# Patient Record
Sex: MALE | Race: OTHER | HISPANIC OR LATINO | URBAN - METROPOLITAN AREA
[De-identification: names, ages, dates, MRNs, and addresses within clinical notes are randomized per-mention and may not be internally consistent; named-entity substitution may affect disease eponyms.]

---

## 2019-08-21 ENCOUNTER — EMERGENCY (EMERGENCY)
Facility: HOSPITAL | Age: 39
LOS: 1 days | Discharge: HOME | End: 2019-08-21
Attending: STUDENT IN AN ORGANIZED HEALTH CARE EDUCATION/TRAINING PROGRAM
Payer: SELF-PAY

## 2019-08-21 VITALS
HEIGHT: 68 IN | OXYGEN SATURATION: 99 % | RESPIRATION RATE: 18 BRPM | DIASTOLIC BLOOD PRESSURE: 77 MMHG | WEIGHT: 199.96 LBS | TEMPERATURE: 99 F | HEART RATE: 75 BPM | SYSTOLIC BLOOD PRESSURE: 117 MMHG

## 2019-08-21 VITALS
RESPIRATION RATE: 17 BRPM | DIASTOLIC BLOOD PRESSURE: 72 MMHG | HEART RATE: 71 BPM | OXYGEN SATURATION: 100 % | SYSTOLIC BLOOD PRESSURE: 121 MMHG | TEMPERATURE: 98 F

## 2019-08-21 DIAGNOSIS — Z98.89 OTHER SPECIFIED POSTPROCEDURAL STATES: Chronic | ICD-10-CM

## 2019-08-21 PROCEDURE — 99284 EMERGENCY DEPT VISIT MOD MDM: CPT

## 2019-08-21 PROCEDURE — 99283 EMERGENCY DEPT VISIT LOW MDM: CPT

## 2019-08-21 RX ORDER — IBUPROFEN 200 MG
1 TABLET ORAL
Qty: 20 | Refills: 0
Start: 2019-08-21 | End: 2019-08-25

## 2019-08-21 RX ORDER — IBUPROFEN 200 MG
600 TABLET ORAL ONCE
Refills: 0 | Status: COMPLETED | OUTPATIENT
Start: 2019-08-21 | End: 2019-08-21

## 2019-08-21 RX ORDER — CYCLOBENZAPRINE HYDROCHLORIDE 10 MG/1
10 TABLET, FILM COATED ORAL ONCE
Refills: 0 | Status: COMPLETED | OUTPATIENT
Start: 2019-08-21 | End: 2019-08-21

## 2019-08-21 RX ORDER — CYCLOBENZAPRINE HYDROCHLORIDE 10 MG/1
1 TABLET, FILM COATED ORAL
Qty: 6 | Refills: 0
Start: 2019-08-21

## 2019-08-21 RX ADMIN — CYCLOBENZAPRINE HYDROCHLORIDE 10 MILLIGRAM(S): 10 TABLET, FILM COATED ORAL at 15:52

## 2019-08-21 RX ADMIN — Medication 600 MILLIGRAM(S): at 17:46

## 2019-08-21 RX ADMIN — Medication 600 MILLIGRAM(S): at 15:52

## 2019-08-21 NOTE — ED PROVIDER NOTE - [facility] ED SYMPTOMS NEG
no bruising/no disorientation/no dizziness/no decreased eating/drinking/no headache/no laceration/no difficulty bearing weight/no sleeping issues/no loss of consciousness

## 2019-08-21 NOTE — ED PROVIDER NOTE - PSH
H/O right knee surgery    History of shoulder surgery  right shoulder for rotator cuff tendon rupture

## 2019-08-21 NOTE — ED PROVIDER NOTE - TOBACCO USE
Encounter addended by: Juan Ramon Raymond M.D. on: 5/11/2017  6:44 PM<BR>     Documentation filed: Clinical Notes Never smoker
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
## 2019-08-21 NOTE — ED ADULT NURSE NOTE - NSIMPLEMENTINTERV_GEN_ALL_ED
Implemented All Universal Safety Interventions:  [place] to call system. Call bell, personal items and telephone within reach. Instruct patient to call for assistance. Room bathroom lighting operational. Non-slip footwear when patient is off stretcher. Physically safe environment: no spills, clutter or unnecessary equipment. Stretcher in lowest position, wheels locked, appropriate side rails in place.
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
## 2024-11-15 NOTE — ED PROVIDER NOTE - CARDIAC, MLM
----- Message from Cammy Flor DO sent at 11/14/2024  1:25 PM EST -----  Regarding: Cologuard 3/29/24 - Listed in Labs - Not Caputured in HM (This is the 2nd pt that this is an issue)  11/14/24 1:26 PM    Hello, our patient Bettina Parkinson has had CRC: Cologuard completed/performed. Please assist in updating the patient chart by pulling the document from  Tab within Chart Review. The date of service is     Cologuard 3/29/24     Thank you,  Cammy Flor DO  [facility]  DM  
Upon review of the In Basket request we were able to note that no further action is required. The patient chart is up to date as a result of a previous request.      Any additional questions or concerns should be emailed to the Practice Liaisons via the appropriate education email address, please do not reply via In Basket.    Thank you  Kenyetta Mina MA   [facility]          
Normal rate, regular rhythm.  Heart sounds S1, S2.  No murmurs, rubs or gallops.